# Patient Record
Sex: MALE | Race: WHITE | Employment: FULL TIME | URBAN - METROPOLITAN AREA
[De-identification: names, ages, dates, MRNs, and addresses within clinical notes are randomized per-mention and may not be internally consistent; named-entity substitution may affect disease eponyms.]

---

## 2021-01-25 ENCOUNTER — HOSPITAL ENCOUNTER (EMERGENCY)
Age: 36
Discharge: HOME | End: 2021-01-25
Payer: COMMERCIAL

## 2021-01-25 ENCOUNTER — APPOINTMENT (OUTPATIENT)
Dept: GENERAL RADIOLOGY | Age: 36
End: 2021-01-25
Payer: COMMERCIAL

## 2021-01-25 VITALS
BODY MASS INDEX: 28.44 KG/M2 | WEIGHT: 210 LBS | SYSTOLIC BLOOD PRESSURE: 158 MMHG | RESPIRATION RATE: 16 BRPM | DIASTOLIC BLOOD PRESSURE: 89 MMHG | OXYGEN SATURATION: 96 % | HEART RATE: 87 BPM | TEMPERATURE: 98.4 F | HEIGHT: 72 IN

## 2021-01-25 DIAGNOSIS — S62.639B OPEN FRACTURE OF TUFT OF DISTAL PHALANX OF FINGER: Primary | ICD-10-CM

## 2021-01-25 PROCEDURE — 12001 RPR S/N/AX/GEN/TRNK 2.5CM/<: CPT

## 2021-01-25 PROCEDURE — 90471 IMMUNIZATION ADMIN: CPT | Performed by: PHYSICIAN ASSISTANT

## 2021-01-25 PROCEDURE — 73140 X-RAY EXAM OF FINGER(S): CPT

## 2021-01-25 PROCEDURE — 99283 EMERGENCY DEPT VISIT LOW MDM: CPT

## 2021-01-25 PROCEDURE — 90715 TDAP VACCINE 7 YRS/> IM: CPT | Performed by: PHYSICIAN ASSISTANT

## 2021-01-25 PROCEDURE — 6360000002 HC RX W HCPCS: Performed by: PHYSICIAN ASSISTANT

## 2021-01-25 PROCEDURE — 96372 THER/PROPH/DIAG INJ SC/IM: CPT

## 2021-01-25 RX ORDER — HYDROCODONE BITARTRATE AND ACETAMINOPHEN 5; 325 MG/1; MG/1
1 TABLET ORAL EVERY 6 HOURS PRN
Qty: 10 TABLET | Refills: 0 | Status: SHIPPED | OUTPATIENT
Start: 2021-01-25 | End: 2021-01-28

## 2021-01-25 RX ORDER — CEPHALEXIN 500 MG/1
500 CAPSULE ORAL 4 TIMES DAILY
Qty: 40 CAPSULE | Refills: 0 | Status: SHIPPED | OUTPATIENT
Start: 2021-01-25 | End: 2021-02-04

## 2021-01-25 RX ORDER — CEFAZOLIN SODIUM 1 G/3ML
1000 INJECTION, POWDER, FOR SOLUTION INTRAMUSCULAR; INTRAVENOUS ONCE
Status: COMPLETED | OUTPATIENT
Start: 2021-01-25 | End: 2021-01-25

## 2021-01-25 RX ADMIN — CEFAZOLIN SODIUM 1000 MG: 1 INJECTION, POWDER, FOR SOLUTION INTRAMUSCULAR; INTRAVENOUS at 19:22

## 2021-01-25 RX ADMIN — TETANUS TOXOID, REDUCED DIPHTHERIA TOXOID AND ACELLULAR PERTUSSIS VACCINE, ADSORBED 0.5 ML: 5; 2.5; 8; 8; 2.5 SUSPENSION INTRAMUSCULAR at 19:21

## 2021-01-25 ASSESSMENT — PAIN SCALES - GENERAL: PAINLEVEL_OUTOF10: 0

## 2021-01-26 ENCOUNTER — OFFICE VISIT (OUTPATIENT)
Dept: ORTHOPEDIC SURGERY | Age: 36
End: 2021-01-26
Payer: COMMERCIAL

## 2021-01-26 VITALS — RESPIRATION RATE: 16 BRPM | HEIGHT: 72 IN | WEIGHT: 210 LBS | BODY MASS INDEX: 28.44 KG/M2

## 2021-01-26 DIAGNOSIS — S62.639B: ICD-10-CM

## 2021-01-26 DIAGNOSIS — S68.119A FINGERTIP AMPUTATION, INITIAL ENCOUNTER: ICD-10-CM

## 2021-01-26 PROCEDURE — 99203 OFFICE O/P NEW LOW 30 MIN: CPT | Performed by: ORTHOPAEDIC SURGERY

## 2021-01-26 NOTE — PROGRESS NOTES
Chief Complaint  Finger Pain (NP RT SMALL FINGER)      History of Present Illness:  Shreyas Arzola is a 28 y.o. male. He presents today for a new hand surgery specialty evaluation regarding his right small finger. He was working with a table saw on 1/25/2021 where he had a laceration to the tip of the right small finger. He did go to the emergency room where he received tetanus and an antibiotic injection and was then referred for follow-up. He has been in a bandage and a splint. Medical History  Patient's medications, allergies, past medical, surgical, social and family histories were reviewed and updated as appropriate. Review of Systems  Pertinent items are noted in HPI  Denies fever, chills, confusion, bowel/bladder active change. Review of systems reviewed from Patient History Form dated on 1/26/21 and available in the patient's chart under the Media tab. Vital Signs  Vitals:    01/26/21 0943   Resp: 16       General Exam:   Constitutional: Patient is adequately groomed with no evidence of malnutrition  Mental Status: The patient is oriented to time, place and person. The patient's mood and affect are appropriate. Lymphatic: The lymphatic examination bilaterally reveals all areas to be without enlargement or induration. Neurological: The patient has good coordination. There is no weakness or sensory deficit. Hand Examination  Inspection: There is soft tissue loss at the distal tip of the finger but I do not appreciate any obvious visualized exposed bone. Palpation: There is tenderness over the fingertip but I do not palpate any obvious exposed bone. There is no pain or warmth over the flexor tendon sheath. Range of Motion: Full range of motion without hesitation    Strength: Normal strength    Special Tests: Hand compartments remain soft    Skin: There are no additional worrisome rashes, ulcerations or lesions.     Gait: normal    Circulation: well perfused Additional Comments:     Additional Examinations:  Left Upper Extremity: Examination of the left upper extremity does not show any tenderness, deformity or injury. Range of motion is unremarkable. There is no gross instability. There are no rashes, ulcerations or lesions. Strength and tone are normal.       Radiology:     X-rays obtained at the emergency department and reviewed in office:  Views 3  Location right small finger  Impression x-rays demonstrate loss of the distal tuft of the small finger phalanx without intra-articular extension. Assessment: Right small finger tip amputation and distal phalanx fracture    Impression:   Encounter Diagnoses   Name Primary?  Fingertip amputation, initial encounter     Open displaced fracture of distal phalanx of finger, initial encounter        Office Procedures:  No orders of the defined types were placed in this encounter. Treatment Plan: I discussed with the patient and his significant other that at this juncture I think it is worthy of allowing for secondary intention to see if this will heal without the need of further shortening and revision amputation in the surgical setting. We will spent some time today demonstrating appropriate wound care and how to do warm soapy soaks twice daily. He does have at least one stitch in place and we will leave that until I see him back in about 2 weeks for repeat wound evaluation and x-ray. Down the road if he is not able to demonstrate adequate secondary intention and epithelialization, revision amputation and shortening can always be considered with or without VY advancement flap. Please note that this transcription was created using voice recognition software. Any errors are unintentional and may be due to voice recognition transcription.

## 2021-01-26 NOTE — ED NOTES
Wound care was applied with saline and antiseptic wash. Pt tolerated well.  RN notified     Jorge Cain  01/25/21 2017

## 2021-01-26 NOTE — ED PROVIDER NOTES
[facility] Emergency Department    CHIEF COMPLAINT  Finger Injury (Pt cut R pinky with table saw. tip of finger appears to be removed. small amount of bleeding. controlled. denies blood thinners. unsure of tetanus. wet to dry dressing applied with gauze and kurlex. )      SHARED SERVICE VISIT:  Evaluated by [facility]. My supervising physician was available for consultation. HISTORY OF PRESENT ILLNESS  Mahogany Avila is a 28 y.o. male who presents to the ED complaining of 1 to 2-hour history of right pinky finger laceration. Patient using table saw when accidentally cut right pinky finger. Tingling sensation at site. Is right-hand dominant. Unaware of last tetanus update. Pain described as throbbing aching exacerbated by touch and movement. Does not radiate. No other complaints, modifying factors or associated symptoms. Nursing notes reviewed. Past Medical History:   Diagnosis Date    Shoulder dislocation      History reviewed. No pertinent surgical history. History reviewed. No pertinent family history.   Social History     Socioeconomic History    Marital status:      Spouse name: Not on file    Number of children: Not on file    Years of education: Not on file    Highest education level: Not on file   Occupational History    Not on file   Social Needs    Financial resource strain: Not on file    Food insecurity     Worry: Not on file     Inability: Not on file    Transportation needs     Medical: Not on file     Non-medical: Not on file   Tobacco Use    Smoking status: Never Smoker    Smokeless tobacco: Never Used   Substance and Sexual Activity    Alcohol use: Yes     Comment: occasionally    Drug use: No    Sexual activity: Not on file   Lifestyle    Physical activity     Days per week: Not on file     Minutes per session: Not on file    Stress: Not on file   Relationships    Social connections     Talks on phone: Not on file     Gets together: Not on file     Attends Restorationist service: Not on file     Active member of club or organization: Not on file     Attends meetings of clubs or organizations: Not on file     Relationship status: Not on file    Intimate partner violence     Fear of current or ex partner: Not on file     Emotionally abused: Not on file     Physically abused: Not on file     Forced sexual activity: Not on file   Other Topics Concern    Not on file   Social History Narrative    Not on file     Current Facility-Administered Medications   Medication Dose Route Frequency Provider Last Rate Last Admin    ceFAZolin (ANCEF) injection 1,000 mg  1,000 mg Intramuscular Once [facility]        Tetanus-Diphth-Acell Pertussis (BOOSTRIX) injection 0.5 mL  0.5 mL Intramuscular Once Sebastien Fix, PA         No current outpatient medications on file. No Known Allergies    REVIEW OF SYSTEMS  6 systems reviewed, pertinent positives per HPI otherwise noted to be negative    PHYSICAL EXAM  BP (!) 157/95   Pulse 95   Temp 98.4 °F (36.9 °C) (Oral)   Resp 16   Ht 6' (1.829 m)   Wt 210 lb (95.3 kg)   SpO2 99%   BMI 28.48 kg/m²   GENERAL APPEARANCE: Awake and alert. Cooperative. No acute distress. HEAD: Normocephalic. Atraumatic. EYES: PERRL. EOM's grossly intact. ENT: Mucous membranes are moist.   NECK: Supple. Normal ROM. CHEST: Equal symmetric chest rise. LUNGS: Breathing is unlabored. Speaking comfortably in full sentences. Abdomen: Nondistended  EXTREMITIES: MAEE. On exam of right hand complex avulsion laceration noted to distal portion of right pinky. Mild nail without nailbed involvement. Complicated laceration measuring approximately 2.0 cm extending into pad of digit. Bleeding well controlled without signs of foreign bodies. Normal flexion and extension against resistance. No loss of thumb-finger opposition. Radial pulses are +2 and cap refill less than 5 seconds. SKIN: Warm and dry.     NEUROLOGICAL: Alert and was cleansed. Laceration portion noted to digit closed. A sterile bandage and static finger splint was applied to the right finger by nursing staff. Patient remained neurovascularly intact status post application discharged home on oral antibiotics with analgesics with recommendations for prompt hand follow-up. Have discussed return precautions as well as recommendations for follow-up otherwise and patient is in agreement and comfortable at discharge. .  A discussion was had with Mr. Kinsey Hernandez regarding finger injury, ED findings and recommendations for follow-up. Risk management discussed and shared decision making had with patient and/or surrogate. All questions were answered. Patient will follow up with orthopedist within 2 to 3 days for wound check and for further evaluation/treatment. Patient will return to ED for new/worsening symptoms. Patient was sent home with a prescription for Norco and Keflex. MDM  I estimate there is LOW risk for COMPARTMENT SYNDROME, DEEP VENOUS THROMBOSIS, SEPTIC ARTHRITIS, TENDON OR NEUROVASCULAR INJURY, thus I consider the discharge disposition reasonable. Debbie Ramos and I have discussed the diagnosis and risks, and we agree with discharging home to follow-up with their primary doctor or the referral orthopedist. We also discussed returning to the Emergency Department immediately if new or worsening symptoms occur. We have discussed the symptoms which are most concerning (e.g., changing or worsening pain, numbness, weakness) that necessitate immediate return. Final Impression  1. Open fracture of tuft of distal phalanx of finger      Blood pressure (!) 158/89, pulse 87, temperature 98.4 °F (36.9 °C), temperature source Oral, resp. rate 16, height 6' (1.829 m), weight 210 lb (95.3 kg), SpO2 96 %. DISPOSITION  Patient was discharged to home in good condition.            Rollagustina Gavin, [place]  01/26/21 9421

## 2021-01-26 NOTE — ED NOTES
All discharge paperwork and follow-up instructions reviewed with pt. Prescriptions reviewed. Pt verbalized understanding. Pt ambulatory upon discharge in stable condition to private vehicle with Wife.        Regis Alcaraz RN  01/25/21 2040